# Patient Record
Sex: FEMALE | URBAN - METROPOLITAN AREA
[De-identification: names, ages, dates, MRNs, and addresses within clinical notes are randomized per-mention and may not be internally consistent; named-entity substitution may affect disease eponyms.]

---

## 2023-03-10 ENCOUNTER — NURSE TRIAGE (OUTPATIENT)
Dept: ADMINISTRATIVE | Facility: CLINIC | Age: 33
End: 2023-03-10

## 2023-03-11 NOTE — TELEPHONE ENCOUNTER
Patient states she has a history of a bad kidney infection that almost led to sepsis. She is concerned because she feels like she may have a kidney infection starting now. Symptoms started three days ago and include foul smelling urine, chills, flank pain (5/10), tachycardia, dizziness, and SOB with exertion. She states she has not been able to start a full stream when urinating; thinks she has urinated normally within the past 4 hours. Denies dysuria and weakness. Care advice is to go to ED now. Pt verbalized understanding.     Reason for Disposition   Patient sounds very sick or weak to the triager    Additional Information   Negative: Passed out (i.e., lost consciousness, collapsed and was not responding)   Negative: Shock suspected (e.g., cold/pale/clammy skin, too weak to stand, low BP, rapid pulse)   Negative: Difficult to awaken or acting confused (e.g., disoriented, slurred speech)   Negative: Sounds like a life-threatening emergency to the triager   Negative: [1] SEVERE pain (e.g., excruciating, scale 8-10) AND [2] present > 1 hour   Negative: [1] SEVERE pain (e.g., excruciating, scale 8-10) AND [2] not improved after pain medicine   Negative: [1] Sudden onset of severe flank pain AND [2] age > 60 years   Negative: [1] Abdominal pain AND [2] age > 60 years   Negative: [1] Unable to urinate (or only a few drops) > 4 hours AND [2] bladder feels very full (e.g., palpable bladder or strong urge to urinate)   Negative: Vomiting   Negative: Weakness of a leg or foot (e.g., unable to bear weight, dragging foot)    Protocols used: Flank Pain-A-AH